# Patient Record
Sex: MALE | Race: WHITE | Employment: FULL TIME | URBAN - METROPOLITAN AREA
[De-identification: names, ages, dates, MRNs, and addresses within clinical notes are randomized per-mention and may not be internally consistent; named-entity substitution may affect disease eponyms.]

---

## 2018-04-09 ENCOUNTER — APPOINTMENT (OUTPATIENT)
Dept: OCCUPATIONAL MEDICINE | Facility: CLINIC | Age: 53
End: 2018-04-09

## 2018-04-09 PROCEDURE — 99000 SPECIMEN HANDLING OFFICE-LAB: CPT | Performed by: PHYSICIAN ASSISTANT

## 2024-04-29 ENCOUNTER — OFFICE VISIT (OUTPATIENT)
Dept: URGENT CARE | Facility: URGENT CARE | Age: 59
End: 2024-04-29

## 2024-04-29 VITALS
HEART RATE: 87 BPM | TEMPERATURE: 98.7 F | OXYGEN SATURATION: 96 % | WEIGHT: 215 LBS | RESPIRATION RATE: 16 BRPM | DIASTOLIC BLOOD PRESSURE: 80 MMHG | SYSTOLIC BLOOD PRESSURE: 118 MMHG

## 2024-04-29 DIAGNOSIS — R07.9 CHEST PAIN, UNSPECIFIED TYPE: Primary | ICD-10-CM

## 2024-04-29 PROCEDURE — 99203 OFFICE O/P NEW LOW 30 MIN: CPT | Mod: 25 | Performed by: PHYSICIAN ASSISTANT

## 2024-04-29 PROCEDURE — 93000 ELECTROCARDIOGRAM COMPLETE: CPT | Performed by: PHYSICIAN ASSISTANT

## 2024-04-29 RX ORDER — VENLAFAXINE HYDROCHLORIDE 150 MG/1
150 CAPSULE, EXTENDED RELEASE ORAL DAILY
COMMUNITY

## 2024-04-29 NOTE — LETTER
April 29, 2024      Mani Fiore  [address] [facility] [address]        To Whom It May Concern:    Mani Fiore  was seen and treated in clinic. Please excuse from work 4/29/24 and 4/30/24.          Sincerely,            Chrissy Camara PA-C

## 2024-04-29 NOTE — PROGRESS NOTES
Assessment & Plan     Chest pain, unspecified type  Patient denies chest pain at this time. EKG is normal. He declines lab work. Gave work note and discussed in detail symptoms that would warrant emergent evaluation in the ED. Patient agrees with plan and will follow up as needed.      - EKG 12-lead complete w/read - Clinics                  Return in about 1 day (around 4/30/2024), or if symptoms worsen or fail to improve.              Subjective   Chief Complaint   Patient presents with    Headache     With dizziness and sore throat. Chest pain (was on pain of 5/10) with dizziness started 1000, EMS arrive at about 1030, did ECG and stated that was normal.       HPI     Cardiac Event    Symptom Onset: 2 hours ago had chest pain lasted for 30 minutes, no chest pain right   Timing of illness: sudden onset.  Current and Associated symptoms: chest pain and chest pressure/discomfort, shortness of breath   Character: sharp/stabbing.  Severity moderately severe.  Location: center chest .  Radiation: none.  Associated Symptoms: SHORTNESS OF BREATH and headache  Exacerbated by: nothing.  Relieved by: improved with rest.  Cardiac risk factors:                       Objective    /80   Pulse 87   Temp 98.7  F (37.1  C) (Tympanic)   Resp 16   Wt 97.5 kg (215 lb)   SpO2 96%   There is no height or weight on file to calculate BMI.  Physical Exam  Constitutional:       General: He is not in acute distress.     Appearance: He is well-developed.   HENT:      Head: Normocephalic and atraumatic.      Right Ear: Tympanic membrane and ear canal normal.      Left Ear: Tympanic membrane and ear canal normal.   Eyes:      Conjunctiva/sclera: Conjunctivae normal.   Cardiovascular:      Rate and Rhythm: Normal rate and regular rhythm.   Pulmonary:      Effort: Pulmonary effort is normal.      Breath sounds: Normal breath sounds.   Skin:     General: Skin is warm and dry.      Findings: No rash.   Psychiatric:         Behavior:  Behavior normal.            EKG - Reviewed and interpreted by me appears normal, NSR, normal axis, normal intervals, no acute ST/T changes c/w ischemia, no LVH by voltage criteria, unchanged from previous tracings        Signed Electronically by: Chrissy Camara PA-C

## 2024-05-24 ENCOUNTER — OFFICE VISIT (OUTPATIENT)
Dept: URGENT CARE | Facility: URGENT CARE | Age: 59
End: 2024-05-24
Payer: OTHER MISCELLANEOUS

## 2024-05-24 VITALS
OXYGEN SATURATION: 93 % | HEART RATE: 84 BPM | SYSTOLIC BLOOD PRESSURE: 152 MMHG | TEMPERATURE: 97.1 F | RESPIRATION RATE: 16 BRPM | DIASTOLIC BLOOD PRESSURE: 102 MMHG | WEIGHT: 211 LBS

## 2024-05-24 DIAGNOSIS — T23.262A PARTIAL THICKNESS BURN OF BACK OF LEFT HAND, INITIAL ENCOUNTER: Primary | ICD-10-CM

## 2024-05-24 PROCEDURE — 99213 OFFICE O/P EST LOW 20 MIN: CPT | Performed by: EMERGENCY MEDICINE

## 2024-05-24 RX ORDER — FLUOXETINE 20 MG/1
20 TABLET, FILM COATED ORAL
COMMUNITY

## 2024-05-24 RX ORDER — ARIPIPRAZOLE 10 MG/1
TABLET ORAL
COMMUNITY
Start: 2024-03-21

## 2024-05-24 NOTE — LETTER
May 24, 2024      Mani Fiore  [address] [address] [facility] [id number]        To Whom It May Concern:    Mani Fiore  was seen on 5/24/24.  Please excuse him  until 5/28/24 due to injury.  He may return without restrictions on that date.        Sincerely,        Amos Rico MD

## 2024-05-24 NOTE — PATIENT INSTRUCTIONS
Ice pack or ice bath for pain  Tylenol or ibuprofen as needed for pain  Cleanse, reapply cream, bandage twice daily.  Recheck if any early signs of infection.